# Patient Record
Sex: FEMALE | Race: OTHER | HISPANIC OR LATINO | URBAN - METROPOLITAN AREA
[De-identification: names, ages, dates, MRNs, and addresses within clinical notes are randomized per-mention and may not be internally consistent; named-entity substitution may affect disease eponyms.]

---

## 2019-06-29 ENCOUNTER — EMERGENCY (EMERGENCY)
Facility: HOSPITAL | Age: 34
LOS: 1 days | Discharge: HOME | End: 2019-06-29
Attending: EMERGENCY MEDICINE | Admitting: EMERGENCY MEDICINE
Payer: COMMERCIAL

## 2019-06-29 VITALS
HEART RATE: 101 BPM | RESPIRATION RATE: 18 BRPM | TEMPERATURE: 100 F | OXYGEN SATURATION: 100 % | SYSTOLIC BLOOD PRESSURE: 133 MMHG | DIASTOLIC BLOOD PRESSURE: 84 MMHG

## 2019-06-29 VITALS — HEART RATE: 78 BPM

## 2019-06-29 LAB
APPEARANCE UR: CLEAR — SIGNIFICANT CHANGE UP
BACTERIA # UR AUTO: NEGATIVE — SIGNIFICANT CHANGE UP
BASOPHILS # BLD AUTO: 0.03 K/UL — SIGNIFICANT CHANGE UP (ref 0–0.2)
BASOPHILS NFR BLD AUTO: 0.5 % — SIGNIFICANT CHANGE UP (ref 0–2)
BILIRUB UR-MCNC: NEGATIVE — SIGNIFICANT CHANGE UP
BLOOD UR QL VISUAL: SIGNIFICANT CHANGE UP
COLOR SPEC: SIGNIFICANT CHANGE UP
EOSINOPHIL # BLD AUTO: 0.02 K/UL — SIGNIFICANT CHANGE UP (ref 0–0.5)
EOSINOPHIL NFR BLD AUTO: 0.4 % — SIGNIFICANT CHANGE UP (ref 0–6)
GLUCOSE UR-MCNC: NEGATIVE — SIGNIFICANT CHANGE UP
HCT VFR BLD CALC: 37.2 % — SIGNIFICANT CHANGE UP (ref 34.5–45)
HGB BLD-MCNC: 11.9 G/DL — SIGNIFICANT CHANGE UP (ref 11.5–15.5)
HYALINE CASTS # UR AUTO: NEGATIVE — SIGNIFICANT CHANGE UP
IMM GRANULOCYTES NFR BLD AUTO: 0.2 % — SIGNIFICANT CHANGE UP (ref 0–1.5)
KETONES UR-MCNC: NEGATIVE — SIGNIFICANT CHANGE UP
LEUKOCYTE ESTERASE UR-ACNC: SIGNIFICANT CHANGE UP
LYMPHOCYTES # BLD AUTO: 1.74 K/UL — SIGNIFICANT CHANGE UP (ref 1–3.3)
LYMPHOCYTES # BLD AUTO: 31.1 % — SIGNIFICANT CHANGE UP (ref 13–44)
MCHC RBC-ENTMCNC: 29.8 PG — SIGNIFICANT CHANGE UP (ref 27–34)
MCHC RBC-ENTMCNC: 32 % — SIGNIFICANT CHANGE UP (ref 32–36)
MCV RBC AUTO: 93.2 FL — SIGNIFICANT CHANGE UP (ref 80–100)
MONOCYTES # BLD AUTO: 0.57 K/UL — SIGNIFICANT CHANGE UP (ref 0–0.9)
MONOCYTES NFR BLD AUTO: 10.2 % — SIGNIFICANT CHANGE UP (ref 2–14)
NEUTROPHILS # BLD AUTO: 3.23 K/UL — SIGNIFICANT CHANGE UP (ref 1.8–7.4)
NEUTROPHILS NFR BLD AUTO: 57.6 % — SIGNIFICANT CHANGE UP (ref 43–77)
NITRITE UR-MCNC: NEGATIVE — SIGNIFICANT CHANGE UP
NRBC # FLD: 0 K/UL — SIGNIFICANT CHANGE UP (ref 0–0)
PH UR: 6 — SIGNIFICANT CHANGE UP (ref 5–8)
PLATELET # BLD AUTO: 147 K/UL — LOW (ref 150–400)
PMV BLD: 12.3 FL — SIGNIFICANT CHANGE UP (ref 7–13)
PROT UR-MCNC: NEGATIVE — SIGNIFICANT CHANGE UP
RBC # BLD: 3.99 M/UL — SIGNIFICANT CHANGE UP (ref 3.8–5.2)
RBC # FLD: 12.2 % — SIGNIFICANT CHANGE UP (ref 10.3–14.5)
RBC CASTS # UR COMP ASSIST: SIGNIFICANT CHANGE UP (ref 0–?)
SP GR SPEC: 1.02 — SIGNIFICANT CHANGE UP (ref 1–1.04)
SQUAMOUS # UR AUTO: SIGNIFICANT CHANGE UP
UROBILINOGEN FLD QL: NORMAL — SIGNIFICANT CHANGE UP
WBC # BLD: 5.6 K/UL — SIGNIFICANT CHANGE UP (ref 3.8–10.5)
WBC # FLD AUTO: 5.6 K/UL — SIGNIFICANT CHANGE UP (ref 3.8–10.5)
WBC UR QL: HIGH (ref 0–?)

## 2019-06-29 PROCEDURE — 99283 EMERGENCY DEPT VISIT LOW MDM: CPT

## 2019-06-29 RX ORDER — ACETAMINOPHEN 500 MG
650 TABLET ORAL ONCE
Refills: 0 | Status: COMPLETED | OUTPATIENT
Start: 2019-06-29 | End: 2019-06-29

## 2019-06-29 RX ORDER — SODIUM CHLORIDE 9 MG/ML
2000 INJECTION INTRAMUSCULAR; INTRAVENOUS; SUBCUTANEOUS ONCE
Refills: 0 | Status: DISCONTINUED | OUTPATIENT
Start: 2019-06-29 | End: 2019-06-29

## 2019-06-29 RX ORDER — OXYCODONE AND ACETAMINOPHEN 5; 325 MG/1; MG/1
1 TABLET ORAL ONCE
Refills: 0 | Status: DISCONTINUED | OUTPATIENT
Start: 2019-06-29 | End: 2019-06-29

## 2019-06-29 RX ORDER — AMPICILLIN SODIUM AND SULBACTAM SODIUM 250; 125 MG/ML; MG/ML
3 INJECTION, POWDER, FOR SUSPENSION INTRAMUSCULAR; INTRAVENOUS ONCE
Refills: 0 | Status: DISCONTINUED | OUTPATIENT
Start: 2019-06-29 | End: 2019-06-29

## 2019-06-29 RX ADMIN — OXYCODONE AND ACETAMINOPHEN 1 TABLET(S): 5; 325 TABLET ORAL at 14:30

## 2019-06-29 RX ADMIN — Medication 650 MILLIGRAM(S): at 14:30

## 2019-06-29 RX ADMIN — Medication 1 TABLET(S): at 14:30

## 2019-06-29 NOTE — ED PROVIDER NOTE - NSFOLLOWUPCLINICS_GEN_ALL_ED_FT
[facility]  Dental  [address] [place], [place] [id number]  Phone: [phone]  Fax:   Follow Up Time: 1-3 Days

## 2019-06-29 NOTE — ED ADULT NURSE NOTE - OBJECTIVE STATEMENT
patient alert ox3 came in c/o dental pain for 1 year and is getting worst. labs done as ordered. awaiting results and re eval.

## 2019-06-29 NOTE — ED PROVIDER NOTE - NS_ ATTENDINGSCRIBEDETAILS _ED_A_ED_FT
The scribe's documentation has been prepared under my direction and personally reviewed by me, Renetta Garza MD, in its entirety. I confirm that the note above accurately reflects all work, treatment, procedures, and medical decision making performed by me.

## 2019-06-29 NOTE — ED PROVIDER NOTE - PHYSICAL EXAMINATION
PHYSICAL EXAM:  Vital signs reviewed.  GENERAL: Patient is awake and alert and in no acute distress.  Non-toxic appearing.  A+Ox4  HEAD:  Airway patent.  No oropharyngeal edema.  No stridor.  Auricles are normal.  Moderate tenderness to palpation over the front lower teeth with axial tapping.  Mild tenderness to palpation over upper frontal teeth with tapping.  No dental abscess.  No facial swelling.  No erythema.    EYES: EOM grossly intact, conjunctiva non-injected and sclera clear  NECK: Supple, No vertebral point tenderness to palpation.  CHEST/LUNG: Lungs clear to auscultation bilaterally; no wheeze, no rhonchi,  no rales.    HEART: Regular rate and rhythm;   ABDOMEN: Soft, non-tender to palpation.  No rebound/no guarding.  Bowel sounds present x 4.   MSK/EXTREMITIES: No clubbing or cyanosis. Back is nontender, with no vertebral point tenderness to palpation, no CVAT.  Moving all 4 extremities.     NEURO: Neurologically grossly intact.   No obvious deficits.   PSYCH: Psychiatrically normal mood and affect.  No apparent risk to self or others.

## 2019-06-29 NOTE — ED PROVIDER NOTE - CLINICAL SUMMARY MEDICAL DECISION MAKING FREE TEXT BOX
Pt is a 33 yr old F with no significant PMHx that presents to ED c/o one year of front dental pain. Plan- Possible pulpitis with acute on chronic multiple dental complaints. No obvious abscess, floor of mouth soft, no stridor, no difficulty swallowing. Will give p/o antibiotics, and analgesics. Follow up with dental clinic on Monday. Pt is a 33 yr old F with no significant PMHx that presents to ED c/o one year of front dental pain. Plan- Possible pulpitis with acute on chronic multiple dental complaints. No obvious abscess, floor of mouth soft, no stridor, no difficulty swallowing. Will give p/o antibiotics, and analgesics. Follow up with dental clinic on Monday. Complete blood count (CBC) with differential to check for leukopenia including specifically lymphopenia (or an absolute lymphocyte count <1500 cells/mL) which is a characteristic of a variety of combined immunodeficiencies (cellular and antibody deficiencies).  Neutropenia can be found in primary phagocyte disorders, as well as in neutrophil disorders that lead to secondary immunodeficiency. Leukocytosis which may indicate infection versus leuko-proliferative disorders versus stress reaction versus non-specific elevated WBC.  Thrombocytopenia which may be seen in ITP or normal dysfunctional platelet which may be seen in the setting of chronic NSAID or aspirin use versus thrombocytosis which can be indicative of nonspecific elevations in acute-phase reactants and can be seen with infectious and inflammatory disorders and suggest the need for further evaluation.   Urinalysis for proteinuria, casts, or cells, which suggest pyelonephritis versus uncompicated cystitis based on clincal presentation, physical exam and lab results or large blood in the absence of red blood cell, as can be seen in rhabdomyolysis in the setting of muscle pain/soreness or fall with possible muscle damage, in need of hydration and checking renal function is not impaired.

## 2019-06-29 NOTE — ED PROVIDER NOTE - NSFOLLOWUPINSTRUCTIONS_ED_ALL_ED_FT
Augmentin 875mg by mouth twice a day for 10 days.    Call dentist Monday morning [phone] or number below.      Instructions for follow-up, activity and diet: Rest, drink plenty of fluids.  Advance activity as tolerated.  Continue all previously prescribed medications as directed.  Take Motrin 600 mg (three 200 mg over the counter pills) every 6 hours as needed for moderate pain -- take with food.  Follow up with doctors as recommended - bring copies of your results.  Return to the ER for worsening or persistent symptoms, including but not limited to fevers, headaches, numbness, weakness, vision changes, double vision, worsening pain or inability to tolerate oral medications and/or ANY NEW OR CONCERNING SYMPTOMS. If you have issues obtaining follow up, please call: [phone] to obtain a doctor or specialist who takes your insurance in your area.     Percocet 1 tablet every 6 hrs as needed for pain.  Do not drink, drive, operate machinery, climb ladders or any other high risk activities for this sedating medicine.  You must drink plenty of fluid.  Avoid alcohol while taking this medication.  This medication contains acetaminophen (Tylenol-brand name).   Do not take additional acetaminophen while taking this medication.

## 2019-06-29 NOTE — ED PROVIDER NOTE - OBJECTIVE STATEMENT
Pt is a 33 yr old F with no significant PMHx that presents to ED c/o one year of front dental pain. Pt states she was told by a dentist to come to the ED for dental care 1 yr ago. Pt states she came today because pain is gradually worsening since the outside dentist referred her to the emergency room. Pt decided to come and see the dentist here. Pt reports the pain is greater in lower jaw than upper, and reports that she takes ibuprofen and Tylenol but it only partially takes care of the pain. Pt denies any radiation to the back of her mouth, any oral or tongue swelling, any pain in her jaw, bleeding gums, or any other medical issues.

## 2019-06-30 LAB — SPECIMEN SOURCE: SIGNIFICANT CHANGE UP

## 2019-07-04 LAB — BACTERIA BLD CULT: SIGNIFICANT CHANGE UP

## 2021-10-21 PROBLEM — Z00.00 ENCOUNTER FOR PREVENTIVE HEALTH EXAMINATION: Status: ACTIVE | Noted: 2021-10-21

## 2021-10-25 ENCOUNTER — APPOINTMENT (OUTPATIENT)
Dept: MATERNAL FETAL MEDICINE | Facility: CLINIC | Age: 36
End: 2021-10-25
Payer: COMMERCIAL

## 2021-10-25 ENCOUNTER — ASOB RESULT (OUTPATIENT)
Age: 36
End: 2021-10-25

## 2021-10-25 PROCEDURE — 99442: CPT

## 2023-11-10 NOTE — ED ADULT NURSE NOTE - NSSUSCREENINGQ5_ED_ALL_ED
ANNUAL EXAM:    Chief Complaint   Patient presents with   • Gyn Exam   • Office Visit         SUBJECTIVE:    Patient ID: Maritza Key is a 37 year old  female.    Patient's last menstrual period was 2023 (approximate).    Here for annual exam  In 2023, missed her menses, took multiple pregnancy tests which were positive and then started to have bleeding. Went to ER and BHCG negative. Advised likely chemical pregnancy.     Has had some episodes of irregular menses in the last year. Menses have been regular since a few months ago.     Trying to conceive for the last few months.     First pregnancy was IVF. Second pregnancy spontaneous. Has frozen embryos with Dr. Little's office.     For the last few weeks has also noticed a vaginal odor.                            Patient's medications, allergies, past medical, surgical, social and family histories were reviewed and updated as appropriate.    OB History    Para Term  AB Living   4 2 2 0 2 2   SAB IAB Ectopic Molar Multiple Live Births   2 0 0 0 0 2        ROS:    All systems reviewed and negative except for those mentioned in the history of present illness    OBJECTIVE:    /81   Pulse 96   Ht 5' 1\" (1.549 m)   Wt 82.6 kg (182 lb 3.2 oz)   LMP 2023 (Approximate)   BMI 34.43 kg/m²   BSA 1.81 m²     Constitutional Exam: well-groomed, pleasant    Pulmonary Exam: No increased work of breathing and No respiratory distress    Cardiovascular Exam: examination of peripheral vasuclar system by observation is normal.     Lymphatic Exam: Normal, Supradavicular  no lymphadenopathy, and Axillary  no lymphadenopathy    Breasts:  Inspection neg. No nipple discharge or bleeding. No mass palpated and no tenderness.  Breasts are symmetrical Axilla neg.    Abdomen: soft    PELVIC EXAM:  Vulva:normal color and texture}  Vagina:mucosa pink and moist and no lesions  Cervix:grossly normal, no masses, and no lesions  Uterus:firm,  non-tender, normal size, normal shape  Adnexa:no masses and no tenderness    Extremities: Normal    Neurologic Exam: Normal and Mental Status/Orientation normal, alert, and oriented person/place/time    +Whiff test    ASSESSMENT:      ED Diagnosis   1. Encounter for gynecological examination without abnormal finding        2. Screening for cervical cancer  Pap Test      3. Vaginal odor  metroNIDAZOLE (METROGEL-VAGINAL) 0.75 % vaginal gel      4. Irregular menstrual bleeding  Thyroid Stimulating Hormone      5. Bacterial vaginosis  metroNIDAZOLE (METROGEL-VAGINAL) 0.75 % vaginal gel              PLAN:   -Refer to Orders   -Patient encouraged to maintain healthy lifestyle, diet and exercise   -Advised SBE.     -Next pap due: done today    Irregular menses: TSH was checked today. Menses have become regular again. Patient to continue monitoring at this time.     -Patient to follow up with PCP for annual exam and screenings.     -All questions answered   -Follow up one year or as needed      Sola Miller MD   No